# Patient Record
Sex: FEMALE | Race: WHITE | NOT HISPANIC OR LATINO | URBAN - METROPOLITAN AREA
[De-identification: names, ages, dates, MRNs, and addresses within clinical notes are randomized per-mention and may not be internally consistent; named-entity substitution may affect disease eponyms.]

---

## 2023-09-29 ENCOUNTER — EMERGENCY (EMERGENCY)
Facility: HOSPITAL | Age: 68
LOS: 1 days | Discharge: HOME | End: 2023-09-29
Attending: STUDENT IN AN ORGANIZED HEALTH CARE EDUCATION/TRAINING PROGRAM | Admitting: STUDENT IN AN ORGANIZED HEALTH CARE EDUCATION/TRAINING PROGRAM
Payer: MEDICARE

## 2023-09-29 VITALS
OXYGEN SATURATION: 97 % | HEART RATE: 71 BPM | WEIGHT: 184.97 LBS | SYSTOLIC BLOOD PRESSURE: 135 MMHG | TEMPERATURE: 97 F | DIASTOLIC BLOOD PRESSURE: 75 MMHG | HEIGHT: 66 IN | RESPIRATION RATE: 18 BRPM

## 2023-09-29 DIAGNOSIS — Z85.43 PERSONAL HISTORY OF MALIGNANT NEOPLASM OF OVARY: ICD-10-CM
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
## 2023-09-29 DIAGNOSIS — S09.90XA UNSPECIFIED INJURY OF HEAD, INITIAL ENCOUNTER: ICD-10-CM

## 2023-09-29 DIAGNOSIS — Z04.3 ENCOUNTER FOR EXAMINATION AND OBSERVATION FOLLOWING OTHER ACCIDENT: ICD-10-CM

## 2023-09-29 DIAGNOSIS — E03.9 HYPOTHYROIDISM, UNSPECIFIED: ICD-10-CM

## 2023-09-29 DIAGNOSIS — M79.7 FIBROMYALGIA: ICD-10-CM

## 2023-09-29 DIAGNOSIS — I10 ESSENTIAL (PRIMARY) HYPERTENSION: ICD-10-CM
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
## 2023-09-29 LAB
BASOPHILS # BLD AUTO: 0.03 K/UL — SIGNIFICANT CHANGE UP (ref 0–0.2)
BASOPHILS NFR BLD AUTO: 0.5 % — SIGNIFICANT CHANGE UP (ref 0–2)
EOSINOPHIL # BLD AUTO: 0.09 K/UL — SIGNIFICANT CHANGE UP (ref 0–0.5)
EOSINOPHIL NFR BLD AUTO: 1.5 % — SIGNIFICANT CHANGE UP (ref 0–6)
HCT VFR BLD CALC: 36.1 % — SIGNIFICANT CHANGE UP (ref 34.5–45)
HGB BLD-MCNC: 12.4 G/DL — SIGNIFICANT CHANGE UP (ref 11.5–15.5)
IMM GRANULOCYTES NFR BLD AUTO: 0.2 % — SIGNIFICANT CHANGE UP (ref 0–0.9)
LYMPHOCYTES # BLD AUTO: 1.1 K/UL — SIGNIFICANT CHANGE UP (ref 1–3.3)
LYMPHOCYTES # BLD AUTO: 18.5 % — SIGNIFICANT CHANGE UP (ref 13–44)
MCHC RBC-ENTMCNC: 31.5 PG — SIGNIFICANT CHANGE UP (ref 27–34)
MCHC RBC-ENTMCNC: 34.3 GM/DL — SIGNIFICANT CHANGE UP (ref 32–36)
MCV RBC AUTO: 91.6 FL — SIGNIFICANT CHANGE UP (ref 80–100)
MONOCYTES # BLD AUTO: 0.4 K/UL — SIGNIFICANT CHANGE UP (ref 0–0.9)
MONOCYTES NFR BLD AUTO: 6.7 % — SIGNIFICANT CHANGE UP (ref 2–14)
NEUTROPHILS # BLD AUTO: 4.31 K/UL — SIGNIFICANT CHANGE UP (ref 1.8–7.4)
NEUTROPHILS NFR BLD AUTO: 72.6 % — SIGNIFICANT CHANGE UP (ref 43–77)
NRBC # BLD: 0 /100 WBCS — SIGNIFICANT CHANGE UP (ref 0–0)
PLATELET # BLD AUTO: 187 K/UL — SIGNIFICANT CHANGE UP (ref 150–400)
RBC # BLD: 3.94 M/UL — SIGNIFICANT CHANGE UP (ref 3.8–5.2)
RBC # FLD: 13.1 % — SIGNIFICANT CHANGE UP (ref 10.3–14.5)
WBC # BLD: 5.94 K/UL — SIGNIFICANT CHANGE UP (ref 3.8–10.5)
WBC # FLD AUTO: 5.94 K/UL — SIGNIFICANT CHANGE UP (ref 3.8–10.5)

## 2023-09-29 PROCEDURE — 99285 EMERGENCY DEPT VISIT HI MDM: CPT | Mod: FS

## 2023-09-29 PROCEDURE — 71045 X-RAY EXAM CHEST 1 VIEW: CPT | Mod: 26

## 2023-09-29 RX ORDER — SODIUM CHLORIDE 9 MG/ML
500 INJECTION INTRAMUSCULAR; INTRAVENOUS; SUBCUTANEOUS ONCE
Refills: 0 | Status: COMPLETED | OUTPATIENT
Start: 2023-09-29 | End: 2023-09-29

## 2023-09-29 RX ORDER — ACETAMINOPHEN 500 MG
1000 TABLET ORAL ONCE
Refills: 0 | Status: COMPLETED | OUTPATIENT
Start: 2023-09-29 | End: 2023-09-29

## 2023-09-29 RX ORDER — METOCLOPRAMIDE HCL 10 MG
10 TABLET ORAL ONCE
Refills: 0 | Status: COMPLETED | OUTPATIENT
Start: 2023-09-29 | End: 2023-09-29

## 2023-09-29 RX ADMIN — Medication 400 MILLIGRAM(S): at 23:44

## 2023-09-29 NOTE — ED ADULT TRIAGE NOTE - CHIEF COMPLAINT QUOTE
Simple / Intermediate / Complex Repair - Final Wound Length In Cm: 0 "I fell and hit my head on the floor 1 hour ago"; with pain and bump/cut on the back of head; denies LOC , no blood thinners; ambulates with steady gait; denies dizziness/lightheadedness  before fall

## 2023-09-29 NOTE — ED ADULT NURSE NOTE - CHIEF COMPLAINT QUOTE
"I fell and hit my head on the floor 1 hour ago"; with pain and bump/cut on the back of head; denies LOC , no blood thinners; ambulates with steady gait; denies dizziness/lightheadedness  before fall

## 2023-09-29 NOTE — ED ADULT TRIAGE NOTE - PAIN: PRESENCE, MLM
From: Rosa Isela Dotson  To: Zhao King  Sent: 1/4/2023 5:32 AM CST  Subject: Neck pain     Good morning Dr. King,it's been awhile. I just wanted to know is it normal For my neck to pop after looking up trying to get something out of a cabinet. It popped to the point where I was in pain for a while that day, that's the 1st time that happened. I just want to know if there's something I should be worried about because it kind of freaked me out because it was kind of loud. I don't know how else to explain it. But it was painful and I Wanted you to be aware in case it happen again. Please feel free to call with any questions. Thanks,  Happy new year!  Rosa Isela Dotson    complains of pain/discomfort

## 2023-09-29 NOTE — ED ADULT NURSE NOTE - OBJECTIVE STATEMENT
Patient arrives to ED awake and alert times 4 status post fall from standing while putting away food at home. States she cannot recall the event she just knows that she fell and hit the back of her head which is giving her a throbbing headache. Denies neck or shoulder pain, nausea, vomiting, or dizziness unless she is moving then the dizziness becomes slight. Denies thinners or LOC. Patient offered gown. Bed placed in the lowest positioning. Safety measures in place. Patient son at bedside. Pending medical eval.

## 2023-09-29 NOTE — ED ADULT NURSE NOTE - NSFALLUNIVINTERV_ED_ALL_ED
Bed/Stretcher in lowest position, wheels locked, appropriate side rails in place/Call bell, personal items and telephone in reach/Instruct patient to call for assistance before getting out of bed/chair/stretcher/Non-slip footwear applied when patient is off stretcher/[place] to call system/Physically safe environment - no spills, clutter or unnecessary equipment/Purposeful proactive rounding/Room/bathroom lighting operational, light cord in reach

## 2023-09-30 VITALS
SYSTOLIC BLOOD PRESSURE: 128 MMHG | TEMPERATURE: 98 F | RESPIRATION RATE: 18 BRPM | OXYGEN SATURATION: 98 % | DIASTOLIC BLOOD PRESSURE: 73 MMHG | HEART RATE: 68 BPM

## 2023-09-30 LAB
ALBUMIN SERPL ELPH-MCNC: 4.1 G/DL — SIGNIFICANT CHANGE UP (ref 3.3–5)
ALP SERPL-CCNC: 56 U/L — SIGNIFICANT CHANGE UP (ref 40–120)
ALT FLD-CCNC: 13 U/L — SIGNIFICANT CHANGE UP (ref 10–45)
ANION GAP SERPL CALC-SCNC: 12 MMOL/L — SIGNIFICANT CHANGE UP (ref 5–17)
APPEARANCE UR: CLEAR — SIGNIFICANT CHANGE UP
APTT BLD: 28.9 SEC — SIGNIFICANT CHANGE UP (ref 24.5–35.6)
AST SERPL-CCNC: 15 U/L — SIGNIFICANT CHANGE UP (ref 10–40)
BILIRUB SERPL-MCNC: 0.2 MG/DL — SIGNIFICANT CHANGE UP (ref 0.2–1.2)
BILIRUB UR-MCNC: NEGATIVE — SIGNIFICANT CHANGE UP
BUN SERPL-MCNC: 29 MG/DL — HIGH (ref 7–23)
CALCIUM SERPL-MCNC: 9.2 MG/DL — SIGNIFICANT CHANGE UP (ref 8.4–10.5)
CHLORIDE SERPL-SCNC: 102 MMOL/L — SIGNIFICANT CHANGE UP (ref 96–108)
CO2 SERPL-SCNC: 27 MMOL/L — SIGNIFICANT CHANGE UP (ref 22–31)
COLOR SPEC: YELLOW — SIGNIFICANT CHANGE UP
CREAT SERPL-MCNC: 0.8 MG/DL — SIGNIFICANT CHANGE UP (ref 0.5–1.3)
DIFF PNL FLD: NEGATIVE — SIGNIFICANT CHANGE UP
EGFR: 79 ML/MIN/1.73M2 — SIGNIFICANT CHANGE UP
GLUCOSE SERPL-MCNC: 141 MG/DL — HIGH (ref 70–99)
GLUCOSE UR QL: NEGATIVE — SIGNIFICANT CHANGE UP
INR BLD: 0.86 — SIGNIFICANT CHANGE UP (ref 0.85–1.18)
KETONES UR-MCNC: NEGATIVE — SIGNIFICANT CHANGE UP
LEUKOCYTE ESTERASE UR-ACNC: NEGATIVE — SIGNIFICANT CHANGE UP
NITRITE UR-MCNC: NEGATIVE — SIGNIFICANT CHANGE UP
PH UR: 6 — SIGNIFICANT CHANGE UP (ref 5–8)
POTASSIUM SERPL-MCNC: 3.3 MMOL/L — LOW (ref 3.5–5.3)
POTASSIUM SERPL-SCNC: 3.3 MMOL/L — LOW (ref 3.5–5.3)
PROT SERPL-MCNC: 6.8 G/DL — SIGNIFICANT CHANGE UP (ref 6–8.3)
PROT UR-MCNC: NEGATIVE MG/DL — SIGNIFICANT CHANGE UP
PROTHROM AB SERPL-ACNC: 9.8 SEC — SIGNIFICANT CHANGE UP (ref 9.5–13)
SODIUM SERPL-SCNC: 141 MMOL/L — SIGNIFICANT CHANGE UP (ref 135–145)
SP GR SPEC: 1.01 — SIGNIFICANT CHANGE UP (ref 1–1.03)
TROPONIN T, HIGH SENSITIVITY RESULT: 7 NG/L — SIGNIFICANT CHANGE UP (ref 0–51)
TROPONIN T, HIGH SENSITIVITY RESULT: <6 NG/L — SIGNIFICANT CHANGE UP (ref 0–51)
UROBILINOGEN FLD QL: 1 E.U./DL — SIGNIFICANT CHANGE UP

## 2023-09-30 PROCEDURE — 96374 THER/PROPH/DIAG INJ IV PUSH: CPT

## 2023-09-30 PROCEDURE — 85610 PROTHROMBIN TIME: CPT

## 2023-09-30 PROCEDURE — 81003 URINALYSIS AUTO W/O SCOPE: CPT

## 2023-09-30 PROCEDURE — 72125 CT NECK SPINE W/O DYE: CPT | Mod: 26,MG

## 2023-09-30 PROCEDURE — 85730 THROMBOPLASTIN TIME PARTIAL: CPT

## 2023-09-30 PROCEDURE — 72125 CT NECK SPINE W/O DYE: CPT | Mod: MG

## 2023-09-30 PROCEDURE — 71045 X-RAY EXAM CHEST 1 VIEW: CPT

## 2023-09-30 PROCEDURE — 83735 ASSAY OF MAGNESIUM: CPT

## 2023-09-30 PROCEDURE — 36415 COLL VENOUS BLD VENIPUNCTURE: CPT

## 2023-09-30 PROCEDURE — 80053 COMPREHEN METABOLIC PANEL: CPT

## 2023-09-30 PROCEDURE — 84484 ASSAY OF TROPONIN QUANT: CPT

## 2023-09-30 PROCEDURE — 70450 CT HEAD/BRAIN W/O DYE: CPT | Mod: MG

## 2023-09-30 PROCEDURE — 99284 EMERGENCY DEPT VISIT MOD MDM: CPT | Mod: 25

## 2023-09-30 PROCEDURE — G1004: CPT

## 2023-09-30 PROCEDURE — 85025 COMPLETE CBC W/AUTO DIFF WBC: CPT

## 2023-09-30 PROCEDURE — 70450 CT HEAD/BRAIN W/O DYE: CPT | Mod: 26,MG

## 2023-09-30 RX ORDER — POTASSIUM CHLORIDE 20 MEQ
40 PACKET (EA) ORAL ONCE
Refills: 0 | Status: COMPLETED | OUTPATIENT
Start: 2023-09-30 | End: 2023-09-30

## 2023-09-30 RX ADMIN — Medication 104 MILLIGRAM(S): at 00:17

## 2023-09-30 RX ADMIN — SODIUM CHLORIDE 1000 MILLILITER(S): 9 INJECTION INTRAMUSCULAR; INTRAVENOUS; SUBCUTANEOUS at 01:06

## 2023-09-30 RX ADMIN — Medication 40 MILLIEQUIVALENT(S): at 01:02

## 2023-09-30 NOTE — ED PROVIDER NOTE - CLINICAL SUMMARY MEDICAL DECISION MAKING FREE TEXT BOX
69 yo female with h/o htn, fibromyalgia, hypothyroid, h/o ovarian CA in the past, s/p VITOR+ chemo,  cancer free now, in the ER after accidental fall at home. Pt mentioned she had dinner with her famikly at home. Remember she went to the fridge  to place left over food inside and then she found herself on the floor. Pt states she fell backward and hit her head. Pt's son witnessed her fall. Son states pt grabbed a door on the refrigerator but she missed and lost balance falling back. Pt slowly got up and was able to walk after. Pt walked with her son for a few blocks to ER. Denies any pain to her hip, upper or lower extremity, chest, abdomen. Pt c/o nausea.  Denies any dizziness, lightheadedness, weakness to her extremities, denies fever, chills, cold or flu-like symptoms.  Pt afebrile and non-toxic appearing, VSS, pt is A&O x 3, with steady gait, non-ischemic ecg, no clinical findings to suspect any acute bony injury. will check labs to r/o any possible electrolyte abnormalities, anemia, check cardiac enzymes, ua, head and cervical neck CT, give antiemetics, reevaluate.

## 2023-09-30 NOTE — ED PROVIDER NOTE - NS ED ROS FT
CONSTITUTIONAL: No fever, no chills, no fatigue, no change in usual activity  EYES:  No discharge, no redness  ENT: No ear discharge, no sore throat   RESPIRATORY: No cough, no SOB   GI: No vomiting, no constipation, no diarrhea, no bloody stools  GENITOURINARY: No dysuria, no frequency, no urgency, no gross hematuria   MUSCULOSKELETAL: No joint pain, no myalgias  SKIN: No rash, no bruises  NEURO: No change in mental status    ALL OTHER SYSTEMS NEGATIVE.

## 2023-09-30 NOTE — ED PROVIDER NOTE - PROGRESS NOTE DETAILS
no acute pathology on CT, labs- no leukocytosis, no anemia, no UTI, trop x 2 negative.  pt is seeking discharge home. Son at bedside and wants to take his mom home. returned precautions discussed.

## 2023-09-30 NOTE — ED PROVIDER NOTE - NSFOLLOWUPINSTRUCTIONS_ED_ALL_ED_FT
Please follow up with your PMD next week for re-evaluation.           Head Injury    WHAT YOU NEED TO KNOW:    What do I need to know about a head injury? A head injury can include your scalp, face, skull, or brain and range from mild to severe. Effects can appear immediately after the injury or develop later. The effects may last a short time or be permanent. Healthcare providers may want to check your recovery over time. Treatment may change as you recover or develop new health problems from the head injury.    What are the signs and symptoms of a head injury?    An open scalp or skin wound, swelling, or bruising    Mild to moderate headache    Dizziness or loss of balance    Nausea or vomiting    Ringing in the ears or neck pain    Confusion, especially right after the injury    Change in mood, such as feeling restless or irritable    Trouble thinking, remembering, or concentrating    Drowsiness or decreased amount of energy    Trouble sleeping  How is a head injury diagnosed?    Tell your healthcare provider about your injury and symptoms. The provider will do an exam to check your brain function. He or she will check how your pupils react to light. He or she will check your memory, hand grasp, and balance.    You may need x-rays, a CT scan, or an MRI to check for bleeding or major damage to your skull or brain. You may be given contrast liquid to help the pictures show up better. Tell the healthcare provider if you have ever had an allergic reaction to contrast liquid. Do not enter the MRI room with anything metal. Metal can cause serious injury. Tell the provider if you have any metal in or on your body.  How is a head injury treated? A mild head injury may not need to be treated. You may be given medicine to decrease pain. Other treatments may depend on how severe your head injury is. A concussion, hematoma (collection of blood), or traumatic brain injury may need both immediate and long-term treatment.    How can I manage my symptoms?    Rest or do quiet activities. Limit your time watching TV, using the computer, or doing tasks that require a lot of thinking. Slowly return to your normal activities as directed. Do not play sports or do activities that may cause you to get hit in the head. Ask your healthcare provider when you can return to sports.    Apply ice on your head for 15 to 20 minutes every hour or as directed. Use an ice pack, or put crushed ice in a plastic bag. Cover it with a towel before you apply it to your skin. Ice helps prevent tissue damage and decreases swelling and pain.    Have someone stay with you for 24 hours , or as directed. This person can monitor you for problems and call for help if needed. When you are awake, the person should ask you a few questions every few hours to see if you are thinking clearly. An example is to ask your name or address.  What can I do to prevent another head injury?    Wear a helmet that fits properly. Do this when you play sports, or ride a bike, scooter, or skateboard. Helmets help decrease your risk for a serious head injury. Talk to your healthcare provider about other ways you can protect yourself if you play sports.    Wear your seatbelt every time you are in a car. This helps lower your risk for a head injury if you are in a car accident.  Call your local emergency number (911 in the ), or have someone else call if:    You cannot be woken.    You have a seizure.    You stop responding to others or you faint.    You have blurry or double vision.    Your speech becomes slurred or confused.    You have arm or leg weakness, loss of feeling, or new problems with coordination.    Your pupils are larger than usual, or one pupil is a different size than the other.    You have blood or clear fluid coming out of your ears or nose.  When should I seek immediate care?    You have repeated or forceful vomiting.    You feel confused.    Your headache gets worse or becomes severe.    You or someone caring for you notices that you are harder to wake than usual.  When should I call my doctor?    Your symptoms last longer than 6 weeks after the injury.    You have questions or concerns about your condition or care.  CARE AGREEMENT:    You have the right to help plan your care. Learn about your health condition and how it may be treated. Discuss treatment options with your healthcare providers to decide what care you want to receive. You always have the right to refuse treatment.

## 2023-09-30 NOTE — ED PROVIDER NOTE - OBJECTIVE STATEMENT
69 yo female with h/o htn, fibromyalgia, hypothyroid, h/o ovarian CA in the past, s/p VITOR+ chemo,  cancer free now, in the ER after accidental fall at home. Pt mentioned she had dinner with her famikly at home. Remember she went to the fridge  to place left over food inside and then she found herself on the floor. Pt states she fell backward and hit her head. Pt's son witnessed her fall. Son states pt grabbed a door on the refrigerator but she missed and lost balance falling back. Pt slowly got up and was able to walk after. Pt walked with her son for a few blocks to ER. Denies any pain to her hip, upper or lower extremity, chest, abdomen. Pt c/o nausea.  Denies any dizziness, lightheadedness, weakness to her extremities, denies fever, chills, cold or flu-like symptoms.

## 2023-09-30 NOTE — ED PROVIDER NOTE - NS ED ATTENDING STATEMENT MOD
This was a shared visit with the [place]. I reviewed and verified the documentation and independently performed the documented:

## 2023-09-30 NOTE — ED PROVIDER NOTE - PATIENT PORTAL LINK FT
You can access the FollowMyHealth Patient Portal offered by [facility] by registering at the following website: [email or web address]. By joining [employer]’s FollowMyHealth portal, you will also be able to view your health information using other applications (apps) compatible with our system.

## 2025-04-15 ENCOUNTER — EMERGENCY (EMERGENCY)
Facility: HOSPITAL | Age: 70
LOS: 1 days | End: 2025-04-15
Attending: STUDENT IN AN ORGANIZED HEALTH CARE EDUCATION/TRAINING PROGRAM | Admitting: STUDENT IN AN ORGANIZED HEALTH CARE EDUCATION/TRAINING PROGRAM
Payer: MEDICARE

## 2025-04-15 ENCOUNTER — TRANSCRIPTION ENCOUNTER (OUTPATIENT)
Age: 70
End: 2025-04-15

## 2025-04-15 VITALS
DIASTOLIC BLOOD PRESSURE: 83 MMHG | SYSTOLIC BLOOD PRESSURE: 140 MMHG | TEMPERATURE: 98 F | RESPIRATION RATE: 18 BRPM | OXYGEN SATURATION: 98 % | HEART RATE: 85 BPM

## 2025-04-15 VITALS
OXYGEN SATURATION: 98 % | TEMPERATURE: 98 F | DIASTOLIC BLOOD PRESSURE: 79 MMHG | SYSTOLIC BLOOD PRESSURE: 164 MMHG | WEIGHT: 149.91 LBS | RESPIRATION RATE: 18 BRPM | HEART RATE: 105 BPM | HEIGHT: 65 IN

## 2025-04-15 PROCEDURE — 13132 CMPLX RPR F/C/C/M/N/AX/G/H/F: CPT | Mod: XU

## 2025-04-15 PROCEDURE — 21337 CLOSED TX SEPTAL&NOSE FX: CPT

## 2025-04-15 PROCEDURE — 13152 CMPLX RPR E/N/E/L 2.6-7.5 CM: CPT | Mod: XU

## 2025-04-15 PROCEDURE — 99284 EMERGENCY DEPT VISIT MOD MDM: CPT

## 2025-04-15 PROCEDURE — 70450 CT HEAD/BRAIN W/O DYE: CPT | Mod: MC

## 2025-04-15 PROCEDURE — 70486 CT MAXILLOFACIAL W/O DYE: CPT | Mod: MC

## 2025-04-15 PROCEDURE — 99285 EMERGENCY DEPT VISIT HI MDM: CPT

## 2025-04-15 PROCEDURE — 70450 CT HEAD/BRAIN W/O DYE: CPT | Mod: 26

## 2025-04-15 PROCEDURE — 21320 CLSD TX NSL FX W/MNPJ&STABLJ: CPT | Mod: XU

## 2025-04-15 PROCEDURE — 21315 CLSD TX NSL FX MNPJ WO STBLJ: CPT | Mod: XU

## 2025-04-15 PROCEDURE — 70486 CT MAXILLOFACIAL W/O DYE: CPT | Mod: 26

## 2025-04-15 RX ORDER — LIDOCAINE HCL/PF 10 MG/ML
10 VIAL (ML) INJECTION ONCE
Refills: 0 | Status: COMPLETED | OUTPATIENT
Start: 2025-04-15 | End: 2025-04-15

## 2025-04-15 RX ORDER — LIDOCAINE HCL/EPINEPHRINE/PF 1 %-1:200K
10 AMPUL (ML) INJECTION ONCE
Refills: 0 | Status: COMPLETED | OUTPATIENT
Start: 2025-04-15 | End: 2025-04-15

## 2025-04-15 RX ORDER — ACETAMINOPHEN 500 MG/5ML
650 LIQUID (ML) ORAL ONCE
Refills: 0 | Status: COMPLETED | OUTPATIENT
Start: 2025-04-15 | End: 2025-04-15

## 2025-04-15 RX ADMIN — Medication 10 MILLILITER(S): at 17:50

## 2025-04-15 RX ADMIN — Medication 650 MILLIGRAM(S): at 21:26

## 2025-04-15 NOTE — ED ADULT NURSE NOTE - OBJECTIVE STATEMENT
69y female c/o mechanical fall today. +head trauma. no LOC. no AC use. small lac to L upper lip. denies dizziness, CP, blurry vision.

## 2025-04-15 NOTE — ED PROVIDER NOTE - PATIENT PORTAL LINK FT
You can access the FollowMyHealth Patient Portal offered by [facility] by registering at the following website: [email or web address]. By joining [employer]’s FollowMyHealth portal, you will also be able to view your health information using other applications (apps) compatible with our system.

## 2025-04-15 NOTE — ED ADULT TRIAGE NOTE - STATUS:
Also I should have been pulled from room or notified immediately about this for future reference Applied

## 2025-04-15 NOTE — ED PROVIDER NOTE - PROGRESS NOTE DETAILS
ct head negative for bleed. ct facial bones shows fracture of anterior nasal spine and nasal bone. spoke with pt regarding lac repair done by ED physician vs plastics. she prefers I call plastics first regarding laceration repair. will call plastics on call Plastics attending Dr. Amador came and repaired lip laceration, also reduced nasal bone fracture, pt tolerated procedures well. pt to f/u in his clinic in 1 week. supportive care advised with PMD f/u as needed. stable for discharge. return precautions given

## 2025-04-15 NOTE — ED PROVIDER NOTE - NSFOLLOWUPINSTRUCTIONS_ED_ALL_ED_FT
Nasal Fracture    WHAT YOU NEED TO KNOW:    What is a nasal fracture? A nasal fracture is a crack or break in your nose. You may have a break in the upper nose (bridge), the side, or the septum. The septum is in the middle of the nose and divides your nostrils.    What are the signs and symptoms of a nasal fracture?    Pain and swelling    Nosebleed    Deformed nose    Crackling sound when you touch or move your nose    Bruising on your nose or under your eyes  How is a nasal fracture diagnosed? Your healthcare provider will ask you when, where, and how the injury occurred. You may need any of the following:    A nasal exam will be done to check your injury. You will be given pain medicine before your healthcare provider touches and looks at the outside and inside of your nose. Your provider will remove blood clots and check for hematomas (collections of blood).  Septal Hematoma       An x-ray or CT may show the nasal fracture. You may be given contrast liquid before the scan. Tell the healthcare provider if you have ever had an allergic reaction to contrast liquid.  How is a nasal fracture treated?    Medicine may be given to decrease pain or help prevent a bacterial infection. Ask how to take pain medicine safely. Medicine may also be given to decrease nasal swelling and help make breathing easier.    Wound care may help stop bleeding. If you have a hematoma inside your nose, it will be drained. Healthcare providers may place packing (gauze or other material) inside your nose to soak up blood.    Closed reduction may be done to put your nasal bones back into the correct position. Local or general anesthesia is used during this procedure. This procedure may be done right away or several days after your injury when the swelling has gone down. Surgery (open reduction) to put your bones back into place may be needed for severe fractures.    Splints or packing help keep your nose in place for 7 to 10 days after a reduction. Ask your healthcare provider how to care for your wounds, splint, or packing.  How do I care for my nasal fracture at home?    Apply ice on your nose for 15 to 20 minutes every hour or as directed. Use an ice pack, or put crushed ice in a plastic bag. Cover it with a towel. Ice helps prevent tissue damage and decreases swelling and pain.    Elevate your head when you lie down. This will help decrease swelling and pain. You may need to see a specialist 3 to 5 days later for tests or more treatment after swelling has gone down.  Elevate Head (Adult)      Protect your nose to prevent bleeding, bruising, or another fracture. Try not to bump your nose on anything. You may not be able to play sports for up to 6 weeks.  When should I seek immediate care?    You feel like one or both of your nasal passages are blocked and you have trouble breathing.    Clear fluid is leaking from your nose.    You have severe nose pain, even after you take medicine.    You have double vision or have problems moving your eyes.  When should I call my doctor?    You have a fever.    You continue to have nosebleeds.    You have a headache that gets worse, even after you take pain medicine.    Your splint or packing is loose.    You have questions or concerns about your condition or care.  CARE AGREEMENT:    You have the right to help plan your care. Learn about your health condition and how it may be treated. Discuss treatment options with your healthcare providers to decide what care you want to receive. You always have the right to refuse treatment.

## 2025-04-15 NOTE — ED ADULT NURSE NOTE - NS ED NURSE RECORD ANOTHER VITAL SIGN
Raymond pt wife regarding pt. Appt canceled at hospital she stated they may be admitted----- Message from Evelin Patel sent at 4/5/2024  8:39 AM CDT -----  Type:  Cancellation Request    Name of Caller:Pts wife   What is the date of the patients appointment?: 04/05  Reason for cancellation?:pt admitted   Would the patient rather a call back or a response via [facility]? Call back   Best Call Back Number:[phone]  Additional Information: patient has been admitted and will not be able to make the appointment. Patients wife states she does not know what his status will be so is unsure when she will reschedule. Please call back if further information is needed.     
Yes

## 2025-04-15 NOTE — ED PROVIDER NOTE - PHYSICAL EXAMINATION
VITAL SIGNS: I have reviewed nursing notes and confirm.  CONSTITUTIONAL: Well appearing, in no acute distress.   SKIN:  warm and dry, no acute rash.   HEAD:  normocephalic, swelling and bruising noted to nose, with deviation to the right. mild bruising noted below left eye, no raccoon eyes noted. EOMI. PERRL.   EYES: EOM intact; conjunctiva and sclera clear.  ENT: No nasal discharge; airway clear. 1cm linear laceration to left upper lip that crosses vermillion border. laceration is not through and through. no loose dentition noted.   NECK: Supple.  CARD: S1, S2, Regular rate and rhythm.   RESP:  Clear to auscultation b/l, no wheezes, rales or rhonchi.  ABD: Normal bowel sounds; soft; non-distended; non-tender; no guarding/ rebound.  EXT: Normal ROM. No peripheral edema. Pulses intact and equal b/l.  NEURO: Alert, oriented, grossly unremarkable

## 2025-04-15 NOTE — ED PROVIDER NOTE - CARE PROVIDER_API CALL
Alexy Amador  Plastic Surgery  [address] [place] [id number]  Phone: [phone]  Fax: [phone]  Scheduled Appointment: 04/22/2025

## 2025-04-15 NOTE — ED PROVIDER NOTE - OBJECTIVE STATEMENT
hx of htn presenting with facial trauma s/p mechanical fall earlier today. pt was walking when she tripped and hit her face on the ground. no LOC. denies being on ASA or blood thinners. endorsing pain to upper lip and nose. no vision changes, headache, n/v, numbness/tingling/weakness distally, neck pain, back pain. last tetanus shot was within the last year. ROS otherwise negative

## 2025-04-15 NOTE — ED PROVIDER NOTE - CLINICAL SUMMARY MEDICAL DECISION MAKING FREE TEXT BOX
pt presenting with facial trauma s/p mechanical fall. AAox4, no neuro deficits. CT head, CT facial bones ordered. will require lip laceration repair

## 2025-04-15 NOTE — ED ADULT TRIAGE NOTE - CHIEF COMPLAINT QUOTE
Pt tripped and fell and presents with avulsion to her upper lip and bruising / swelling to the bridge of her nose. Pt denies LOC. Pt denies AC use. Pt denies dizziness, vision changes, n/v.

## 2025-04-22 ENCOUNTER — EMERGENCY (EMERGENCY)
Facility: HOSPITAL | Age: 70
LOS: 1 days | End: 2025-04-22
Attending: EMERGENCY MEDICINE | Admitting: EMERGENCY MEDICINE
Payer: MEDICARE

## 2025-04-22 VITALS
OXYGEN SATURATION: 99 % | DIASTOLIC BLOOD PRESSURE: 82 MMHG | WEIGHT: 149.91 LBS | HEIGHT: 65 IN | TEMPERATURE: 98 F | SYSTOLIC BLOOD PRESSURE: 128 MMHG | RESPIRATION RATE: 20 BRPM | HEART RATE: 70 BPM

## 2025-04-22 VITALS
OXYGEN SATURATION: 100 % | TEMPERATURE: 98 F | SYSTOLIC BLOOD PRESSURE: 124 MMHG | HEART RATE: 70 BPM | DIASTOLIC BLOOD PRESSURE: 81 MMHG | RESPIRATION RATE: 20 BRPM

## 2025-04-22 PROCEDURE — 71250 CT THORAX DX C-: CPT | Mod: MC

## 2025-04-22 PROCEDURE — 99284 EMERGENCY DEPT VISIT MOD MDM: CPT | Mod: 25

## 2025-04-22 PROCEDURE — 71250 CT THORAX DX C-: CPT | Mod: 26

## 2025-04-22 PROCEDURE — 99284 EMERGENCY DEPT VISIT MOD MDM: CPT

## 2025-04-22 RX ORDER — ACETAMINOPHEN 500 MG/5ML
650 LIQUID (ML) ORAL ONCE
Refills: 0 | Status: COMPLETED | OUTPATIENT
Start: 2025-04-22 | End: 2025-04-22

## 2025-04-22 RX ADMIN — Medication 650 MILLIGRAM(S): at 11:23

## 2025-04-22 NOTE — ED ADULT NURSE NOTE - CINV DISCH TEACH PARTICIP
Spoke with Jeane and asked that she call [facility] and ask if they can transfer the script to [facility]. She states that the [facility] doesn't have [facility] and [facility] in [facility] does have it.    Patient/Spouse

## 2025-04-22 NOTE — ED ADULT TRIAGE NOTE - CHIEF COMPLAINT QUOTE
pt states she had mechanical fall last week and now has a lot of bruising to her chest and ribs. pt is concerned she has rib fractures. +pain on inspiration.

## 2025-04-22 NOTE — ED PROVIDER NOTE - CLINICAL SUMMARY MEDICAL DECISION MAKING FREE TEXT BOX
69F with PMHx on HTN, not on blood thinners, who p/w chest wall pain and bruising after fall one week ago. Pt had a mechanical trip and fall on uneven side walk and fell forward, hitting her face. She was seen in this ER and had a CT head which was negative for ICH and a CT MF that showed a nasal fracture. She was seen by plastics, Dr. Amador for lip laceration repair. Later that evening she started to noticed chest discomfort that is worse with palpation - this has persisted and is associated with bruising to her left chest/breast and right upper chest. She is concerned about rib fracture. She saw Dr. Amador today for follow up and was referred to ER for imaging.  Pt has been taking tylenol for pain - did not take any today yet. No f/c, no sob no n/v, no abd pain, no ha or neck pain, no dizziness or syncope, no n/t/w in ext. No other complaints. Not on blood thinners.   VSS, well-appeairng, no resp distress, lungs clear, ecchymosis and TTP to upper ant chest wall L>R with ecchymosis. Plan for CT chest r/o fracture or other injury, tylenol for pain. 69F with PMHx on HTN, not on blood thinners, who p/w chest wall pain and bruising after fall one week ago. Pt had a mechanical trip and fall on uneven side walk and fell forward, hitting her face. She was seen in this ER and had a CT head which was negative for ICH and a CT MF that showed a nasal fracture. She was seen by plastics, Dr. Amador for lip laceration repair. Later that evening she started to noticed chest discomfort that is worse with palpation - this has persisted and is associated with bruising to her left chest/breast and right upper chest. She is concerned about rib fracture. She saw Dr. Amador today for follow up and was referred to ER for imaging.  Pt has been taking tylenol for pain - did not take any today yet. No f/c, no sob no n/v, no abd pain, no ha or neck pain, no dizziness or syncope, no n/t/w in ext. No other complaints. Not on blood thinners.   VSS, well-appearing, no resp distress, lungs clear, ecchymosis and TTP to upper ant chest wall L>R with ecchymosis. Plan for CT chest r/o fracture or other injury, tylenol for pain.    Old fractures left anterior   third, fourth, fifth, sixth ribs at the costochondral junction. Acute   fractures with slight displacement of the left anterior fifth and sixth   ribs. 69F with PMHx on HTN, not on blood thinners, who p/w chest wall pain and bruising after fall one week ago. Pt had a mechanical trip and fall on uneven side walk and fell forward, hitting her face. She was seen in this ER and had a CT head which was negative for ICH and a CT MF that showed a nasal fracture. She was seen by plastics, Dr. Amador for lip laceration repair. Later that evening she started to noticed chest discomfort that is worse with palpation - this has persisted and is associated with bruising to her left chest/breast and right upper chest. She is concerned about rib fracture. She saw Dr. Amador today for follow up and was referred to ER for imaging.  Pt has been taking tylenol for pain - did not take any today yet. No f/c, no sob no n/v, no abd pain, no ha or neck pain, no dizziness or syncope, no n/t/w in ext. No other complaints. Not on blood thinners.   VSS, well-appearing, no resp distress, lungs clear, ecchymosis and TTP to upper ant chest wall L>R with ecchymosis. Plan for CT chest r/o fracture or other injury, tylenol for pain.  CT shows:  Old fractures left anterior third, fourth, fifth, sixth ribs at the costochondral junction. Acute fractures with slight displacement of the left anterior fifth and sixth ribs. Trace Left pleural effusion. R GH OA.   Pt states she fell last Thanksgiving, which is likely when she sustained old rib fractures. Pt given Incentive spirometer and recommendations for pain control with tylenol, ice pack, etc. Fall precautions given as well.   Pt feeling improved and is stable for DC. ED evaluation and management discussed with the patient in detail.  Close PMD follow up encouraged.  Strict ED return instructions discussed in detail and patient given the opportunity to ask any questions about their discharge diagnosis and instructions. Patient verbalized understanding.

## 2025-04-22 NOTE — ED ADULT NURSE NOTE - OBJECTIVE STATEMENT
Pt is a 68yo female presenting to ED c/o fall. Pt reports falling one week ago, tripping on sidewalk and landing on face. Pt came to ER and had sutures placed for cut on lip, came to ED today d/t pain to left side and bruising that has not improved since fall. Pt noted to have bruising to left chest, entire face, closed laceration to upper lip. Pt is A&Ox4, breathing even and unlabored speaking in clear full sentences, ambulatory, denies vision changes, difficulty swallowing, c/p, sob, f/c.

## 2025-04-22 NOTE — ED PROVIDER NOTE - NSFOLLOWUPINSTRUCTIONS_ED_ALL_ED_FT
1. You were seen for rib fracture . Your CT scan of the chest showed: Old fractures left anterior third, fourth, fifth, sixth ribs at the costochondral junction and Acute fractures with slight displacement of the left anterior fifth and sixth ribs. You have a trace (very small) left pleural effusion.   Please use incentive spirometer as directed and take tylenol every 4-6 hours for pain.   A copy of any of your resulted labs, imaging, and findings have been provided to you. Make sure to view any test results that may not have yet resulted at the time of your discharge by creating a FollowMyHealth account at: [email or web address] to sign up for FollowMyHealth.   2. Continue to take your home medications as prescribed.   3. Please follow up with your primary care physician. You may call our referrals coordinator at [phone] Monday to Friday 11am-7pm for assistance with making an appointment. Or you can call [phone] to make an appointment.  4. Return to the emergency department for new, persistent, or worsening symptoms or signs, or for any concerning symptoms.   5. For your for health, you should make healthy food choices and be physically active. Also, you should not smoke or use drugs, and you should not drink alcohol in excess. Please visit [email or web address] for resources and more information.    Rib Fracture    A rib fracture is a break or crack in one of the bones of the ribs. The ribs are long, curved bones that wrap around your chest and attach to your spine and your breastbone. The ribs protect your heart, lungs, and other organs in the chest.    A broken or cracked rib is often painful but is not usually serious. Most rib fractures heal on their own over time. However, rib fractures can be more serious if multiple ribs are broken or if broken ribs move out of place and push against other structures or organs.    What are the causes?  This condition is caused by:  Repetitive movements with high force, such as pitching a baseball or having very bad coughing spells.  A direct hit the chest, such as a sports injury, a car crash, or a fall.  Cancer that has spread to the bones, which can weaken bones and cause them to break.  What are the signs or symptoms?  Symptoms of this condition include:  Pain when you breathe in or cough.  Pain when someone presses on the injured area.  Feeling short of breath.  How is this diagnosed?  This condition is diagnosed with a physical exam and medical history. You may also have imaging tests, such as:  Chest X-ray.  CT scan.  MRI.  Bone scan.  Chest ultrasound.  How is this treated?  Treatment for this condition depends on the severity of the fracture. Most rib fractures usually heal on their own in 1–3 months. Healing may take longer if you have a cough or if you do activities that make the injury worse. While you heal, you may be given medicines to control the pain. You will also be taught deep breathing exercises.    Severe injuries may require hospitalization or surgery.    Follow these instructions at home:  Managing pain, stiffness, and swelling    If directed, put ice on the injured area. To do this:  Put ice in a plastic bag.  Place a towel between your skin and the bag.  Leave the ice on for 20 minutes, 2–3 times a day.  Remove the ice if your skin turns bright red. This is very important. If you cannot feel pain, heat, or cold, you have a greater risk of damage to the area.  Take over-the-counter and prescription medicines only as told by your health care provider.  Activity    Avoid doing activities or movements that cause pain. Be careful during activities and avoid bumping the injured rib.  Slowly increase your activity as told by your health care provider.  General instructions    Do deep breathing exercises as told by your health care provider. This helps prevent pneumonia, which is a common complication of a broken rib. Your health care provider may instruct you to:  Take deep breaths several times a day.  Try to cough several times a day, holding a pillow against the injured area.  Use a device called incentive spirometer to practice deep breathing several times a day.  Drink enough fluid to keep your urine pale yellow.  Do not wear a rib belt or binder. These restrict breathing, which can lead to pneumonia.  Keep all follow-up visits. This is important.  Contact a health care provider if:  You have a fever.  Get help right away if:  You have difficulty breathing or you are short of breath.  You develop a cough that does not stop, or you cough up thick or bloody sputum.  You have nausea, vomiting, or pain in your abdomen.  Your pain gets worse and medicine does not help.  These symptoms may represent a serious problem that is an emergency. Do not wait to see if the symptoms will go away. Get medical help right away. Call your local emergency services (911 in the [place] Do not drive yourself to the hospital.    Summary  A rib fracture is a break or crack in one of the bones of the ribs.  A broken or cracked rib is often painful but is not usually serious.  Most rib fractures heal on their own over time.  Treatment for this condition depends on the severity of the fracture.  Avoid doing any activities or movements that cause pain.  This information is not intended to replace advice given to you by your health care provider. Make sure you discuss any questions you have with your health care provider.    Fall Prevention in the Home, Adult    Falls can cause injuries. They can happen to people of all ages. There are many things you can do to make your home safe and to help prevent falls. Ask for help when making these changes, if needed.  What actions can I take to prevent falls?    General Instructions     Use good lighting in all rooms. Replace any light bulbs that burn out.   Turn on the lights when you go into a dark area. Use night-lights.  Keep items that you use often in easy-to-reach places. Lower the shelves around your home if necessary.  Set up your furniture so you have a clear path. Avoid moving your furniture around.  Do not have throw rugs and other things on the floor that can make you trip.  Avoid walking on wet floors.  If any of your floors are uneven, fix them.  Add color or contrast paint or tape to clearly nayan and help you see:  Any grab bars or handrails.  First and last steps of stairways.  Where the edge of each step is.  If you use a stepladder:  Make sure that it is fully opened. Do not climb a closed stepladder.  Make sure that both sides of the stepladder are locked into place.  Ask someone to hold the stepladder for you while you use it.  If there are any pets around you, be aware of where they are.  What can I do in the bathroom?         Keep the floor dry. Clean up any water that spills onto the floor as soon as it happens.  Remove soap buildup in the tub or shower regularly.  Use non-skid mats or decals on the floor of the tub or shower.  Attach bath mats securely with double-sided, non-slip rug tape.  If you need to sit down in the shower, use a plastic, non-slip stool.  Install grab bars by the toilet and in the tub and shower. Do not use towel bars as grab bars.  What can I do in the bedroom?     Make sure that you have a light by your bed that is easy to reach.  Do not use any sheets or blankets that are too big for your bed. They should not hang down onto the floor.  Have a firm chair that has side arms. You can use this for support while you get dressed.  What can I do in the kitchen?     Clean up any spills right away.  If you need to reach something above you, use a strong step stool that has a grab bar.  Keep electrical cords out of the way.  Do not use floor polish or wax that makes floors slippery. If you must use wax, use non-skid floor wax.  What can I do with my stairs?     Do not leave any items on the stairs.  Make sure that you have a light switch at the top of the stairs and the bottom of the stairs. If you do not have them, ask someone to add them for you.   Make sure that there are handrails on both sides of the stairs, and use them. Fix handrails that are broken or loose. Make sure that handrails are as long as the stairways.  Install non-slip stair treads on all stairs in your home.  Avoid having throw rugs at the top or bottom of the stairs. If you do have throw rugs, attach them to the floor with carpet tape.  Choose a carpet that does not hide the edge of the steps on the stairway.  Check any carpeting to make sure that it is firmly attached to the stairs. Fix any carpet that is loose or worn.  What can I do on the outside of my home?     Use bright outdoor lighting.  Regularly fix the edges of walkways and driveways and fix any cracks.  Remove anything that might make you trip as you walk through a door, such as a raised step or threshold.  Trim any bushes or trees on the path to your home.  Regularly check to see if handrails are loose or broken. Make sure that both sides of any steps have handrails.  Install guardrails along the edges of any raised decks and porches.  Clear walking paths of anything that might make someone trip, such as tools or rocks.  Have any leaves, snow, or ice cleared regularly.  Use sand or salt on walking paths during winter.  Clean up any spills in your garage right away. This includes grease or oil spills.  What other actions can I take?     Wear shoes that:  Have a low heel. Do not wear high heels.  Have rubber bottoms.  Are comfortable and fit you well.  Are closed at the toe. Do not wear open-toe sandals.  Use tools that help you move around (mobility aids) if they are needed. These include:  Canes.  Walkers.  Scooters.  Crutches.  Review your medicines with your doctor. Some medicines can make you feel dizzy. This can increase your chance of falling.  Ask your doctor what other things you can do to help prevent falls.  Where to find more information  Centers for Disease Control and PreventionBRETT: https://cdc.govNational [place] on Aging: https://yd4kwbl.bernardo.nih.govContact a doctor if:  You are afraid of falling at home.  You feel weak, drowsy, or dizzy at home.  You fall at home.  Summary  There are many simple things that you can do to make your home safe and to help prevent falls.  Ways to make your home safe include removing tripping hazards and installing grab bars in the bathroom.  Ask for help when making these changes in your home.  This information is not intended to replace advice given to you by your health care provider. Make sure you discuss any questions you have with your health care provider.

## 2025-04-22 NOTE — ED PROVIDER NOTE - PHYSICAL EXAMINATION
GEN: Well appearing, well developed, awake, alert, oriented to person, place, time/situation and in no apparent distress. NTAF  ENT: Airway patent, Nasal mucosa clear. Mouth with normal mucosa.  EYES: Clear bilaterally. PERRL, EOMI  RESPIRATORY: Breathing comfortably with normal RR. No W/C/R, no hypoxia or resp distress.  CARDIAC: Regular rate and rhythm, no M/R/G  Chest: Mild right upper anterior chest wall TTP, Left upper anterior chest wall TTP with associated L chest and and Left breast resolving ecchymosis, no fluctuance, crepitus, flail, erythema, drainage, or warmth  ABDOMEN: Soft, nontender,  no rebound, rigidity, or guarding.  MSK: Range of motion is not limited, no deformities noted. No midline c/t/l-spine TTP.   NEURO: Alert and oriented x 3. Cn 2-12 intact. Strength 5/5 and sensation intact in all 4 extremities. Gait normal.   SKIN: Skin normal color for race, warm, dry and intact. No evidence of rash.  PSYCH: Alert and oriented to person, place, time/situation. normal mood and affect. no apparent risk to self or others.

## 2025-04-22 NOTE — ED PROVIDER NOTE - PATIENT PORTAL LINK FT
You can access the FollowMyHealth Patient Portal offered by [facility] by registering at the following website: [email or web address]. By joining [facility]’s FollowMyHealth portal, you will also be able to view your health information using other applications (apps) compatible with our system.

## 2025-04-22 NOTE — ED PROVIDER NOTE - OBJECTIVE STATEMENT
69F with PMHx on HTN, not on blood thinners, who p/w chest wall pain and bruising after fall one week ago. Pt had a mechanical trip and fall on uneven side walk and fell forward, hitting her face. She was seen in this ER and had a CT head which was negative for ICH and a CT MF that showed a nasal fracture. She was seen by plastics, Dr. Amador for lip laceration repair. Later that evening she started to noticed chest discomfort that is worse with palpation - this has persisted and is associated with bruising to her left chest/breast and right upper chest. She is concerned about rib fracture. She saw Dr. Amador today for follow up and was referred to ER for imaging.  Pt has been taking tylenol for pain - did not take any today yet. No f/c, no sob no n/v, no abd pain, no ha or neck pain, no dizziness or syncope, no n/t/w in ext. No other complaints. Not on blood thinners.

## 2025-04-24 DIAGNOSIS — I10 ESSENTIAL (PRIMARY) HYPERTENSION: ICD-10-CM

## 2025-04-24 DIAGNOSIS — J90 PLEURAL EFFUSION, NOT ELSEWHERE CLASSIFIED: ICD-10-CM

## 2025-04-24 DIAGNOSIS — R07.89 OTHER CHEST PAIN: ICD-10-CM

## 2025-04-24 DIAGNOSIS — S22.42XA MULTIPLE FRACTURES OF RIBS, LEFT SIDE, INITIAL ENCOUNTER FOR CLOSED FRACTURE: ICD-10-CM
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
## 2025-05-28 ENCOUNTER — APPOINTMENT (OUTPATIENT)
Dept: CT IMAGING | Facility: HOSPITAL | Age: 70
End: 2025-05-28

## 2025-05-28 ENCOUNTER — OUTPATIENT (OUTPATIENT)
Dept: OUTPATIENT SERVICES | Facility: HOSPITAL | Age: 70
LOS: 1 days | End: 2025-05-28
Payer: MEDICARE

## 2025-05-28 PROCEDURE — 70486 CT MAXILLOFACIAL W/O DYE: CPT | Mod: MH

## 2025-05-28 PROCEDURE — 70486 CT MAXILLOFACIAL W/O DYE: CPT | Mod: 26

## 2025-06-13 NOTE — ED PROVIDER NOTE - PHYSICAL EXAMINATION
Daily Note     Today's date: 2025  Patient name: Mary Russo  : 1963  MRN: [id number]  Referring provider: Graciela Bui PA-C  Dx:   Encounter Diagnosis     ICD-10-CM    1. Nontraumatic complete tear of rotator cuff, left  M75.122           Start Time: 0800  Stop Time: 0840  Total time in clinic (min): 40 minutes    Subjective: Pt reports to therapy citing no current pain or lingering soreness from incident where he actively moved his affected limb to cath a towel. He maintains there is also no lingering soreness from updated POC.       Objective: See treatment diary below      Assessment: Tolerated treatment well. Pt performed AAROM table slides into flexion and scaption w/o pain or soreness. Pt tolerated all TE well even w/increase in wrist flexion/extension and supination resistance. Plan to continue AAROM NV as we transfer into next stage of protocol. Patient would benefit from continued PT      Plan: Continue per plan of care.      Precautions: Past Medical History[1]  Wound Care: Keep the dressing clean and dry.  Light drainage may occur the first 2 days postop.   You may remove the dressings and get the incision wet in the shower 48 hours after surgery.  DO NOT remove steri-strips or sutures.  DO NOT immerse the incision under water.  Carefully pat the incision dry.  If there is wound drainage, re-apply a fresh dry gauze dressing.     Sling: wear the sling at all times, including sleep, except for hygiene for 6 weeks following surgery. Keep the arm by the side during hygiene. The patient may remove the sling, keeping the arm by the side to perform gentle wrist and elbow range of motion. The sling may be removed for PT sessions as described below.     If ARTHROSCOPIC/OPEN SUBPECTORAL BICEPS TENODESIS was performed, the patient can perform passive and active assisted ROM as tolerated. Avoid active and resisted elbow flexion and supination until 6 weeks post-op.     If SUBSCAPULARIS REPAIR was  performed, avoid external rotation beyond 30 degrees until 6 weeks post op.     PHASE I (Weeks 0-4): passive range of motion        · Begin passive range of motion within 7 days following surgery. Completed 3 times per week        · Begin scapula exercises with the arm in the sling within 7 days following surgery, including scapular elevation, depression, retraction, and protraction. Completed daily Weeks 0-6        · Begin pendulum exercises at home. Completed 2 times per day     PHASE II (Weeks 4-8): active assisted range of motion BEGIN 6/20       · Week 4: lying active assisted motion             o Using a stick or cane while lying flat, the nonsurgical arm moves the injured arm through different motions, including forward flexion, external rotation, and abduction. Completed daily        · Weeks 5: 45-degree active assisted motion             o Using a stick or cane while lying propped at 45 degrees, the nonsurgical arm moves the injured arm through different motions, including forward flexion, external rotation, and abduction. Completed daily        · Weeks 6-8: upright active assisted motion             o Using a stick or cane while sitting up or standing, the nonsurgical arm moves the injured arm through different motions, including forward flexion, external rotation, and abduction. Completed daily        · Week 6: scapula exercises without the sling, including scapular elevation, depression, retraction, and protraction. Completed daily     PHASE III (Weeks 8-12): active motion BEGIN 7/17       · AROM: while sitting up or standing, actively forward flex and abduct the shoulders. Completed daily             o Important not to hike the shoulder up towards the ear.        · Isometric exercises: push and pull a folded towel or pillow into a wall. Completed daily, holding push/pull for 15s with 30s rest in between for 10-15 reps             o Completed with elbow flexed at 90 degrees and with shoulder  "internal/external rotation     PHASE IV (Weeks 12-16): resisted exercise BEGIN 8/14       · Begin rotator cuff strengthening using weights and/or elastic bands. Completed 3 times per week for 3-4 sets of 10-15 reps             o Resisted shoulder internal/external rotation             o Resisted deltoid strengthening             o Resisted scapula strengthening             o AVOID doing full can or empty can exercises        · Begin light shoulder stretching             o Hold each stretch for 15s, then rest 15s. Repeat 5 times             o After Week 16, may use aggressive stretching if needed       DOS 5/16/25  SOC: 5/22/25  FOTO: 5/22/25  POC Expiration: 8/14/25  AUTH EXP: 8/20/25  Last RE:  Daily Treatment Log:  Date 6/10/25 6/13/25 5/27/25 5/29/25 6/2/2025  FOTO    Visit # 6 7 3 4 5   AUTH 6/12 7/12 3/12 4/12 5/12   AUTH EXP 8/20/25 8/20/25 8/20/25 8/20/25 8/20/2025   Manual 10'  10'  10' 10'    Flexion/ER/IR RB all planes  SJ all planes SJ; 10-20x each w/ ER hold SJ; 10-20x each w/ ER hold RB all planes                            There Exer 28'  40' 20' 28' 30'    Objective Measures        PROM table slides flex  5\"x10; 2x flex/scap 5\"x10; 2x flex/scap   5\"x10; 2x    Supine cane ER in neutral w/ TR under elbow  5\"x10  5\"x10       Shoulder rolls Bwd shoulder rolls 20x  Bwd shoulder rolls 20x  3x10 3x10; 5 fwd 5 back Bwd shoulder rolls 20x    Scap retractions 5\"x10  5\"x10  3x10 3x10 5\"x10; 2x    Scap elevations   3x10 3x10    Pendulums-Horizontal and Vertical 20x each 20x each 3x10 each 3x10 20x ea    Wrist flexion Supported on table 1# DB 2x10  Supported on table 1# DB 2x10  2x10 seated w/ elbow supported on table 2x15 seated w/ elbow supported on table Supported on table 1# DB 2x10    Wrist extension Supported on table 1# DB 2x10 Supported on table 1# DB 2x10 2x10 seated w/ elbow supported on table 2x15 seated w/ elbow supported on table Supported on table 1# DB 2x10     Elbow flexion from neutral Full ROM " "2x10  Full ROM 2x10 From 90-full flexion; 10x Full ROM w/ TR support Full ROM 1x10; 2x    Std elbow in 90 flex Sup/pronation  2x10 ea  Holding therabar for increased stretch; 2x10    1x10; 2x    Trap stretching 10\"x5 R/L cerv SB  10\"x5 R/L cerv SB   B/L 10x5s 10\"x5 R/L cerv SB    gripper Blk 35# 20x  Blk 35# 20x                               HEP     Updated & issued    There Activ                                                        NMReed                                                Modalities                                HEP:   Access Code: [email or web address]  URL: [email or web address]  Date: 06/02/2025  Prepared by: Jayleen Swansonzzcass    Exercises  - Seated Scapular Retraction  - 1 x daily - 7 x weekly - 1-3 sets - 10 reps  - Standing Shoulder Shrugs  - 1 x daily - 7 x weekly - 1-3 sets - 10 reps  - Flexion-Extension Shoulder Pendulum with Table Support  - 2 x daily - 7 x weekly - 1-3 sets - 10 reps  - Horizontal Shoulder Pendulum with Table Support  - 2 x daily - 7 x weekly - 1-3 sets - 10 reps  - Elbow AROM Flexion/Extension Forearm in Neutral in Supine  - 1 x daily - 7 x weekly - 1-2 sets - 10 reps  - Wrist Flexion AROM  - 1 x daily - 7 x weekly - 1-2 sets - 10 reps  - Wrist Flexion Extension AROM - Palms Down  - 1 x daily - 7 x weekly - 1-2 sets - 10 reps  - Standing Backward Shoulder Rolls  - 1 x daily - 7 x weekly - 1-2 sets - 10 reps  - Standing Upper Trapezius Stretch  - 1 x daily - 7 x weekly - 1-2 sets - 10 reps - 5s hold  - Seated Trunk Rotation - Arms Crossed  - 1 x daily - 7 x weekly - 1-2 sets - 10 reps  - Seated Shoulder Flexion Towel Slide at Table Top  - 1 x daily - 7 x weekly - 2 sets - 10 reps - 5sec hold                                   [1]   Past Medical History:  Diagnosis Date    Lyme disease      " Constitutional: awake and alert, in no acute distress  HEENT: head normocephalic , palpable tender posterior scalp hematoma,  moist mucous membranes  Eyes: extraocular movements intact, normal conjunctiva  Neck: supple, normal ROM, non localized tenderness to c-spine, no step off signs,   Cardiovascular: regular rate   Pulmonary: no respiratory distress, lungs- CTA b/l  Gastrointestinal: abdomen flat and nondistended, NT  Skin: warm, dry, normal for ethnicity  Musculoskeletal: no edema, no deformity, NROM, good distal pulses,   Neurological: oriented x4, no focal neurologic deficit.   Psychiatric: calm and cooperative, no SI/HI

## 2025-08-20 VITALS
RESPIRATION RATE: 18 BRPM | WEIGHT: 147.71 LBS | DIASTOLIC BLOOD PRESSURE: 70 MMHG | SYSTOLIC BLOOD PRESSURE: 124 MMHG | OXYGEN SATURATION: 100 % | HEIGHT: 66 IN | HEART RATE: 70 BPM | TEMPERATURE: 98 F

## 2025-08-20 RX ORDER — METFORMIN HYDROCHLORIDE 850 MG/1
1 TABLET ORAL
Refills: 0 | DISCHARGE

## 2025-08-20 RX ORDER — SEMAGLUTIDE 1 MG/.5ML
2.4 INJECTION, SOLUTION SUBCUTANEOUS
Refills: 0 | DISCHARGE

## 2025-08-21 ENCOUNTER — INPATIENT (INPATIENT)
Facility: HOSPITAL | Age: 70
LOS: 0 days | Discharge: HOME | End: 2025-08-22
Attending: SURGERY | Admitting: SURGERY
Payer: MEDICARE

## 2025-08-21 ENCOUNTER — TRANSCRIPTION ENCOUNTER (OUTPATIENT)
Age: 70
End: 2025-08-21

## 2025-08-21 DIAGNOSIS — C80.1 MALIGNANT (PRIMARY) NEOPLASM, UNSPECIFIED: Chronic | ICD-10-CM

## 2025-08-21 DIAGNOSIS — Z98.890 OTHER SPECIFIED POSTPROCEDURAL STATES: Chronic | ICD-10-CM

## 2025-08-21 DIAGNOSIS — Z98.49 CATARACT EXTRACTION STATUS, UNSPECIFIED EYE: Chronic | ICD-10-CM

## 2025-08-21 PROCEDURE — 88302 TISSUE EXAM BY PATHOLOGIST: CPT | Mod: 26

## 2025-08-21 PROCEDURE — 99223 1ST HOSP IP/OBS HIGH 75: CPT | Mod: GC

## 2025-08-21 RX ORDER — ONDANSETRON HCL/PF 4 MG/2 ML
4 VIAL (ML) INJECTION
Refills: 0 | Status: DISCONTINUED | OUTPATIENT
Start: 2025-08-21 | End: 2025-08-22

## 2025-08-21 RX ORDER — OXYCODONE HYDROCHLORIDE 30 MG/1
5 TABLET ORAL ONCE
Refills: 0 | Status: DISCONTINUED | OUTPATIENT
Start: 2025-08-21 | End: 2025-08-21

## 2025-08-21 RX ORDER — LABETALOL HYDROCHLORIDE 200 MG/1
10 TABLET, FILM COATED ORAL ONCE
Refills: 0 | Status: COMPLETED | OUTPATIENT
Start: 2025-08-21 | End: 2025-08-21

## 2025-08-21 RX ORDER — INSULIN LISPRO 100 U/ML
INJECTION, SOLUTION INTRAVENOUS; SUBCUTANEOUS
Refills: 0 | Status: DISCONTINUED | OUTPATIENT
Start: 2025-08-21 | End: 2025-08-21

## 2025-08-21 RX ORDER — SODIUM CHLORIDE 9 G/1000ML
1000 INJECTION, SOLUTION INTRAVENOUS
Refills: 0 | Status: DISCONTINUED | OUTPATIENT
Start: 2025-08-21 | End: 2025-08-21

## 2025-08-21 RX ORDER — MELATONIN 5 MG
3 TABLET ORAL AT BEDTIME
Refills: 0 | Status: DISCONTINUED | OUTPATIENT
Start: 2025-08-21 | End: 2025-08-22

## 2025-08-21 RX ORDER — DEXTROSE 50 % IN WATER 50 %
25 SYRINGE (ML) INTRAVENOUS ONCE
Refills: 0 | Status: DISCONTINUED | OUTPATIENT
Start: 2025-08-21 | End: 2025-08-21

## 2025-08-21 RX ORDER — LOSARTAN POTASSIUM 100 MG/1
100 TABLET, FILM COATED ORAL DAILY
Refills: 0 | Status: DISCONTINUED | OUTPATIENT
Start: 2025-08-21 | End: 2025-08-22

## 2025-08-21 RX ORDER — HYDROCHLOROTHIAZIDE 50 MG/1
1 TABLET ORAL
Refills: 0 | DISCHARGE

## 2025-08-21 RX ORDER — AMOXICILLIN AND CLAVULANATE POTASSIUM 500; 125 MG/1; MG/1
1 TABLET, FILM COATED ORAL DAILY
Refills: 0 | Status: DISCONTINUED | OUTPATIENT
Start: 2025-08-21 | End: 2025-08-22

## 2025-08-21 RX ORDER — METOPROLOL SUCCINATE 50 MG/1
25 TABLET, EXTENDED RELEASE ORAL
Refills: 0 | Status: DISCONTINUED | OUTPATIENT
Start: 2025-08-21 | End: 2025-08-22

## 2025-08-21 RX ORDER — HYDROMORPHONE/SOD CHLOR,ISO/PF 2 MG/10 ML
0.5 SYRINGE (ML) INJECTION
Refills: 0 | Status: DISCONTINUED | OUTPATIENT
Start: 2025-08-21 | End: 2025-08-21

## 2025-08-21 RX ORDER — GLUCAGON 3 MG/1
1 POWDER NASAL ONCE
Refills: 0 | Status: DISCONTINUED | OUTPATIENT
Start: 2025-08-21 | End: 2025-08-22

## 2025-08-21 RX ORDER — DEXTROSE 50 % IN WATER 50 %
12.5 SYRINGE (ML) INTRAVENOUS ONCE
Refills: 0 | Status: DISCONTINUED | OUTPATIENT
Start: 2025-08-21 | End: 2025-08-21

## 2025-08-21 RX ORDER — ACETAMINOPHEN 500 MG/5ML
1000 LIQUID (ML) ORAL EVERY 6 HOURS
Refills: 0 | Status: COMPLETED | OUTPATIENT
Start: 2025-08-21 | End: 2025-08-22

## 2025-08-21 RX ORDER — DULOXETINE 20 MG/1
1 CAPSULE, DELAYED RELEASE ORAL
Refills: 0 | DISCHARGE

## 2025-08-21 RX ORDER — OXYCODONE HYDROCHLORIDE 30 MG/1
5 TABLET ORAL EVERY 4 HOURS
Refills: 0 | Status: DISCONTINUED | OUTPATIENT
Start: 2025-08-21 | End: 2025-08-22

## 2025-08-21 RX ORDER — NAPROXEN SODIUM 275 MG
1 TABLET ORAL
Refills: 0 | DISCHARGE

## 2025-08-21 RX ORDER — ENOXAPARIN SODIUM 100 MG/ML
40 INJECTION SUBCUTANEOUS EVERY 24 HOURS
Refills: 0 | Status: DISCONTINUED | OUTPATIENT
Start: 2025-08-21 | End: 2025-08-22

## 2025-08-21 RX ORDER — LEVOTHYROXINE SODIUM 300 MCG
125 TABLET ORAL DAILY
Refills: 0 | Status: DISCONTINUED | OUTPATIENT
Start: 2025-08-21 | End: 2025-08-22

## 2025-08-21 RX ORDER — OXYCODONE HYDROCHLORIDE 30 MG/1
10 TABLET ORAL EVERY 4 HOURS
Refills: 0 | Status: DISCONTINUED | OUTPATIENT
Start: 2025-08-21 | End: 2025-08-22

## 2025-08-21 RX ORDER — LEVOTHYROXINE SODIUM 300 MCG
1 TABLET ORAL
Refills: 0 | DISCHARGE

## 2025-08-21 RX ORDER — DEXTROSE 50 % IN WATER 50 %
15 SYRINGE (ML) INTRAVENOUS ONCE
Refills: 0 | Status: DISCONTINUED | OUTPATIENT
Start: 2025-08-21 | End: 2025-08-21

## 2025-08-21 RX ORDER — LOSARTAN POTASSIUM 100 MG/1
1 TABLET, FILM COATED ORAL
Refills: 0 | DISCHARGE

## 2025-08-21 RX ORDER — TOPIRAMATE 25 MG/1
1 TABLET, FILM COATED ORAL
Refills: 0 | DISCHARGE

## 2025-08-21 RX ORDER — ATORVASTATIN CALCIUM 80 MG/1
10 TABLET, FILM COATED ORAL AT BEDTIME
Refills: 0 | Status: DISCONTINUED | OUTPATIENT
Start: 2025-08-21 | End: 2025-08-22

## 2025-08-21 RX ORDER — TOPIRAMATE 25 MG/1
100 TABLET, FILM COATED ORAL EVERY 12 HOURS
Refills: 0 | Status: DISCONTINUED | OUTPATIENT
Start: 2025-08-21 | End: 2025-08-22

## 2025-08-21 RX ORDER — DULOXETINE 20 MG/1
20 CAPSULE, DELAYED RELEASE ORAL DAILY
Refills: 0 | Status: DISCONTINUED | OUTPATIENT
Start: 2025-08-21 | End: 2025-08-22

## 2025-08-21 RX ORDER — ACETAMINOPHEN 500 MG/5ML
975 LIQUID (ML) ORAL EVERY 8 HOURS
Refills: 0 | Status: DISCONTINUED | OUTPATIENT
Start: 2025-08-21 | End: 2025-08-22

## 2025-08-21 RX ADMIN — OXYCODONE HYDROCHLORIDE 10 MILLIGRAM(S): 30 TABLET ORAL at 19:46

## 2025-08-21 RX ADMIN — LABETALOL HYDROCHLORIDE 10 MILLIGRAM(S): 200 TABLET, FILM COATED ORAL at 14:55

## 2025-08-21 RX ADMIN — ATORVASTATIN CALCIUM 10 MILLIGRAM(S): 80 TABLET, FILM COATED ORAL at 22:15

## 2025-08-21 RX ADMIN — LABETALOL HYDROCHLORIDE 10 MILLIGRAM(S): 200 TABLET, FILM COATED ORAL at 15:41

## 2025-08-21 RX ADMIN — LOSARTAN POTASSIUM 100 MILLIGRAM(S): 100 TABLET, FILM COATED ORAL at 14:01

## 2025-08-21 RX ADMIN — Medication 0.5 MILLIGRAM(S): at 13:22

## 2025-08-21 RX ADMIN — Medication 400 MILLIGRAM(S): at 16:36

## 2025-08-21 RX ADMIN — OXYCODONE HYDROCHLORIDE 5 MILLIGRAM(S): 30 TABLET ORAL at 14:30

## 2025-08-21 RX ADMIN — METOPROLOL SUCCINATE 25 MILLIGRAM(S): 50 TABLET, EXTENDED RELEASE ORAL at 17:08

## 2025-08-21 RX ADMIN — ENOXAPARIN SODIUM 40 MILLIGRAM(S): 100 INJECTION SUBCUTANEOUS at 22:15

## 2025-08-21 RX ADMIN — Medication 400 MILLIGRAM(S): at 22:14

## 2025-08-21 RX ADMIN — AMOXICILLIN AND CLAVULANATE POTASSIUM 1 TABLET(S): 500; 125 TABLET, FILM COATED ORAL at 22:24

## 2025-08-21 RX ADMIN — Medication 1000 MILLIGRAM(S): at 17:06

## 2025-08-21 RX ADMIN — TOPIRAMATE 100 MILLIGRAM(S): 25 TABLET, FILM COATED ORAL at 16:32

## 2025-08-21 RX ADMIN — Medication 0.5 MILLIGRAM(S): at 12:52

## 2025-08-22 ENCOUNTER — TRANSCRIPTION ENCOUNTER (OUTPATIENT)
Age: 70
End: 2025-08-22

## 2025-08-22 VITALS
OXYGEN SATURATION: 98 % | HEART RATE: 75 BPM | SYSTOLIC BLOOD PRESSURE: 134 MMHG | RESPIRATION RATE: 18 BRPM | DIASTOLIC BLOOD PRESSURE: 64 MMHG

## 2025-08-22 LAB
A1C WITH ESTIMATED AVERAGE GLUCOSE RESULT: 4.9 % — SIGNIFICANT CHANGE UP (ref 4–5.6)
ESTIMATED AVERAGE GLUCOSE: 94 MG/DL — SIGNIFICANT CHANGE UP (ref 68–114)

## 2025-08-22 PROCEDURE — 83036 HEMOGLOBIN GLYCOSYLATED A1C: CPT

## 2025-08-22 PROCEDURE — 36415 COLL VENOUS BLD VENIPUNCTURE: CPT

## 2025-08-22 PROCEDURE — 30410 RECONSTRUCTION OF NOSE: CPT

## 2025-08-22 PROCEDURE — C9399: CPT

## 2025-08-22 PROCEDURE — 21335 OPEN TX NOSE & SEPTAL FX: CPT

## 2025-08-22 PROCEDURE — 30520 REPAIR OF NASAL SEPTUM: CPT

## 2025-08-22 PROCEDURE — 13152 CMPLX RPR E/N/E/L 2.6-7.5 CM: CPT

## 2025-08-22 PROCEDURE — 15769 GRFG AUTOL SOFT TISS DIR EXC: CPT

## 2025-08-22 PROCEDURE — 82962 GLUCOSE BLOOD TEST: CPT

## 2025-08-22 PROCEDURE — 88302 TISSUE EXAM BY PATHOLOGIST: CPT

## 2025-08-22 PROCEDURE — 30465 REPAIR NASAL STENOSIS: CPT

## 2025-08-22 RX ORDER — METOPROLOL SUCCINATE 50 MG/1
0.5 TABLET, EXTENDED RELEASE ORAL
Qty: 30 | Refills: 0
Start: 2025-08-22 | End: 2025-09-20

## 2025-08-22 RX ADMIN — LOSARTAN POTASSIUM 100 MILLIGRAM(S): 100 TABLET, FILM COATED ORAL at 05:49

## 2025-08-22 RX ADMIN — Medication 125 MICROGRAM(S): at 05:58

## 2025-08-22 RX ADMIN — OXYCODONE HYDROCHLORIDE 10 MILLIGRAM(S): 30 TABLET ORAL at 01:42

## 2025-08-22 RX ADMIN — OXYCODONE HYDROCHLORIDE 10 MILLIGRAM(S): 30 TABLET ORAL at 00:44

## 2025-08-22 RX ADMIN — OXYCODONE HYDROCHLORIDE 5 MILLIGRAM(S): 30 TABLET ORAL at 11:00

## 2025-08-22 RX ADMIN — Medication 400 MILLIGRAM(S): at 05:49

## 2025-08-22 RX ADMIN — TOPIRAMATE 100 MILLIGRAM(S): 25 TABLET, FILM COATED ORAL at 05:49

## 2025-08-22 RX ADMIN — AMOXICILLIN AND CLAVULANATE POTASSIUM 1 TABLET(S): 500; 125 TABLET, FILM COATED ORAL at 12:00

## 2025-08-22 RX ADMIN — DULOXETINE 20 MILLIGRAM(S): 20 CAPSULE, DELAYED RELEASE ORAL at 12:00

## 2025-08-22 RX ADMIN — OXYCODONE HYDROCHLORIDE 5 MILLIGRAM(S): 30 TABLET ORAL at 10:29

## 2025-08-29 DIAGNOSIS — Z85.43 PERSONAL HISTORY OF MALIGNANT NEOPLASM OF OVARY: ICD-10-CM

## 2025-08-29 DIAGNOSIS — S02.2XXA FRACTURE OF NASAL BONES, INITIAL ENCOUNTER FOR CLOSED FRACTURE: ICD-10-CM
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
## 2025-08-29 DIAGNOSIS — J34.3 HYPERTROPHY OF NASAL TURBINATES: ICD-10-CM

## 2025-08-29 DIAGNOSIS — I10 ESSENTIAL (PRIMARY) HYPERTENSION: ICD-10-CM

## 2025-08-29 DIAGNOSIS — Z79.899 OTHER LONG TERM (CURRENT) DRUG THERAPY: ICD-10-CM

## 2025-08-29 DIAGNOSIS — Z79.85 LONG-TERM (CURRENT) USE OF INJECTABLE NON-INSULIN ANTIDIABETIC DRUGS: ICD-10-CM

## 2025-08-29 DIAGNOSIS — F32.A DEPRESSION, UNSPECIFIED: ICD-10-CM

## 2025-08-29 DIAGNOSIS — E03.9 HYPOTHYROIDISM, UNSPECIFIED: ICD-10-CM

## 2025-08-29 DIAGNOSIS — Z79.84 LONG TERM (CURRENT) USE OF ORAL HYPOGLYCEMIC DRUGS: ICD-10-CM

## 2025-08-29 DIAGNOSIS — E78.00 PURE HYPERCHOLESTEROLEMIA, UNSPECIFIED: ICD-10-CM

## 2025-08-29 DIAGNOSIS — E11.9 TYPE 2 DIABETES MELLITUS WITHOUT COMPLICATIONS: ICD-10-CM

## 2025-08-29 DIAGNOSIS — Z87.891 PERSONAL HISTORY OF NICOTINE DEPENDENCE: ICD-10-CM

## 2025-08-29 DIAGNOSIS — R51.9 HEADACHE, UNSPECIFIED: ICD-10-CM

## 2025-08-29 DIAGNOSIS — M79.7 FIBROMYALGIA: ICD-10-CM
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
## (undated) DEVICE — POSITIONER FOAM HEAD DONUT MULTI-RING (PINK)

## (undated) DEVICE — DRSG TELFA 3 X 8

## (undated) DEVICE — SUT MONOCRYL 5-0 18" P-3 UNDYED

## (undated) DEVICE — SUT VICRYL 4-0 27" SH UNDYED

## (undated) DEVICE — SUT PROLENE 6-0 18" P-1

## (undated) DEVICE — DRSG TEGADERM 4 X 4.75"

## (undated) DEVICE — SUT SILK 5-0 18" P-3

## (undated) DEVICE — PACK UPPER BODY

## (undated) DEVICE — SUT PDS II 3-0 18" PS-2 UNDYED

## (undated) DEVICE — DRSG MASTISOL

## (undated) DEVICE — Device

## (undated) DEVICE — DRSG TEGADERM 2.5 X 3"

## (undated) DEVICE — WARMING BLANKET LOWER ADULT

## (undated) DEVICE — SUT CHROMIC 5-0 27" RB-1

## (undated) DEVICE — SUT PDS II 5-0 18" P-3 UNDYED

## (undated) DEVICE — SUT MONOCRYL 3-0 27" PS-2 UNDYED

## (undated) DEVICE — SUT NYLON 3-0 18" PS-2

## (undated) DEVICE — BLADE SURGICAL #15 CARBON

## (undated) DEVICE — POSITIONER FOAM EGG CRATE ULNAR 2PCS (PINK)

## (undated) DEVICE — SUT PDS II 5-0 27" RB-1

## (undated) DEVICE — SUT ETHILON 6-0 18" P-1

## (undated) DEVICE — SUT ETHILON 6-0 18" PC-1

## (undated) DEVICE — STRYKER COLORADO N-SERIES 3CM STRAIGHT

## (undated) DEVICE — SUT ETHILON 5-0 18" P-3

## (undated) DEVICE — VENODYNE/SCD SLEEVE CALF MEDIUM

## (undated) DEVICE — SUT PDS II 4-0 18" PS-2 UNDYED

## (undated) DEVICE — DRSG 2X2

## (undated) DEVICE — BLOCK SILICON

## (undated) DEVICE — SUT VICRYL 4-0 27" RB-1 UNDYED

## (undated) DEVICE — SUT ETHILON 5-0 18" P-3 UNDYED

## (undated) DEVICE — SUT MONOCRYL 3-0 27" SH

## (undated) DEVICE — SUT PROLENE 3-0 18" PS-1